# Patient Record
Sex: FEMALE | Race: WHITE | NOT HISPANIC OR LATINO | Employment: OTHER
[De-identification: names, ages, dates, MRNs, and addresses within clinical notes are randomized per-mention and may not be internally consistent; named-entity substitution may affect disease eponyms.]

---

## 2021-01-15 DIAGNOSIS — Z23 NEED FOR VACCINATION: ICD-10-CM

## 2021-11-03 ENCOUNTER — OFFICE VISIT (OUTPATIENT)
Dept: MEDICAL GROUP | Facility: CLINIC | Age: 86
End: 2021-11-03
Payer: COMMERCIAL

## 2021-11-03 VITALS
RESPIRATION RATE: 16 BRPM | BODY MASS INDEX: 20.5 KG/M2 | HEIGHT: 60 IN | SYSTOLIC BLOOD PRESSURE: 165 MMHG | DIASTOLIC BLOOD PRESSURE: 62 MMHG | WEIGHT: 104.4 LBS | HEART RATE: 61 BPM | OXYGEN SATURATION: 95 %

## 2021-11-03 DIAGNOSIS — I10 PRIMARY HYPERTENSION: ICD-10-CM

## 2021-11-03 DIAGNOSIS — L57.0 ACTINIC KERATOSES: ICD-10-CM

## 2021-11-03 DIAGNOSIS — K21.00 GASTROESOPHAGEAL REFLUX DISEASE WITH ESOPHAGITIS WITHOUT HEMORRHAGE: ICD-10-CM

## 2021-11-03 DIAGNOSIS — D32.0 INTRACRANIAL MENINGIOMA (HCC): ICD-10-CM

## 2021-11-03 PROBLEM — G93.89 MASS OF BRAIN: Status: ACTIVE | Noted: 2019-11-25

## 2021-11-03 PROBLEM — N95.1 MENOPAUSAL SYNDROME: Status: ACTIVE | Noted: 2021-11-03

## 2021-11-03 PROBLEM — K21.9 GASTROESOPHAGEAL REFLUX DISEASE: Status: ACTIVE | Noted: 2019-11-01

## 2021-11-03 PROBLEM — R42 VERTIGO: Status: ACTIVE | Noted: 2021-11-03

## 2021-11-03 PROBLEM — R41.0 MENTAL CONFUSION: Status: ACTIVE | Noted: 2019-11-01

## 2021-11-03 PROBLEM — Z00.00 ENCOUNTER FOR GENERAL ADULT MEDICAL EXAMINATION WITHOUT ABNORMAL FINDINGS: Status: ACTIVE | Noted: 2019-02-07

## 2021-11-03 PROBLEM — K58.9 IRRITABLE BOWEL SYNDROME: Status: ACTIVE | Noted: 2021-11-03

## 2021-11-03 PROCEDURE — 99213 OFFICE O/P EST LOW 20 MIN: CPT | Performed by: FAMILY MEDICINE

## 2021-11-03 RX ORDER — AMLODIPINE BESYLATE 5 MG/1
5 TABLET ORAL
COMMUNITY
Start: 2021-08-26 | End: 2022-03-04 | Stop reason: SDUPTHER

## 2021-11-03 NOTE — ASSESSMENT & PLAN NOTE
I will continue to follow along.  She no longer sees a neurosurgeon for this,  will let me know if there is any new symptoms

## 2021-11-03 NOTE — ASSESSMENT & PLAN NOTE
We will do a trial of omeprazole 20 mg once a day.  Did discuss that the nonsteroidal anti-inflammatories will make this worse.  Caffeine may be adding to it slightly if symptoms do not resolve or recur after stopping we will discuss further evaluation, possibly to include GI referral, H Pylori labs

## 2021-11-03 NOTE — ASSESSMENT & PLAN NOTE
Neither of these appear to have any concerning changes.  I would like to freeze both of them with liquid nitrogen.  Clinic does not have this yet so we will have her either return or try to get it done at our other clinic.

## 2021-11-03 NOTE — PROGRESS NOTES
"Subjective:     CC: GI c/o, rash    HPI:   Promise presents today to discuss the following issues     Problem   Vertigo   Menopausal Syndrome   Irritable Bowel Syndrome   Actinic Keratoses    Pruritic lesion on the left forearm has been there at least a couple of weeks.  No recent changes there is another lesion on the right upper arm that is not particularly itching but has been there \"quite a while\"     Intracranial Meningioma (Hcc)    No treatment has been offered, it is being observed.  Patient is asymptomatic other than a mild pressure sensation.  Has not had any other further episodes of the confusion that led to this diagnosis.     Mass of Brain   Gastroesophageal Reflux Disease    Year hx of burning, belching and gas. Has used OTC antacids, Aloe. Beans bother, Acidic  Foods worse. Drinks 3-4 cups coffee/d. Rare alcohol. Uses Aleve a few times a week. ? If effects. Rare diarrhea. occas hemmorhoidal blood.      Mental Confusion   Encounter for General Adult Medical Examination Without Abnormal Findings   Hyperlipidemia   Htn (Hypertension)    Sl elevated today.  A lot of difficulty getting to our new office.         Current Outpatient Medications Ordered in Epic   Medication Sig Dispense Refill   • metoprolol tartrate (LOPRESSOR) 25 MG Tab Take 25 mg by mouth.     • amLODIPine (NORVASC) 5 MG Tab Take 5 mg by mouth every day.       No current [facility]-ordered facility-administered medications on file.       Health Maintenance:     ROS:  Gen: no fevers/chills, no changes in weight  Eyes: no changes in vision  ENT: no sore throat, no hearing loss, no bloody nose  Pulm: no sob, no cough  CV: no chest pain, no palpitations  GI: no nausea/vomiting, no diarrhea  : no dysuria  MSk: no myalgias  Skin: no rash  Neuro: no headaches, no numbness/tingling  Heme/Lymph: no easy bruising      Objective:     Exam:  BP (!) 165/62 (BP Location: Right arm, Patient Position: Sitting, BP Cuff Size: Adult)   Pulse 61   Resp 16   Ht " 1.524 m (5')   Wt 47.4 kg (104 lb 6.4 oz)   SpO2 95%   BMI 20.39 kg/m²  Body mass index is 20.39 kg/m².    Gen: Alert and oriented, No apparent distress.  Neck: Neck is supple without lymphadenopathy.  Lungs: Normal effort, CTA bilaterally, no wheezes, rhonchi, or rales  CV: Regular rate and rhythm. No murmurs, rubs, or gallops.  Ext: No clubbing, cyanosis, edema.          Assessment & Plan:     86 y.o. female with the following -     Problem List Items Addressed This Visit     Gastroesophageal reflux disease     We will do a trial of omeprazole 20 mg once a day.  Did discuss that the nonsteroidal anti-inflammatories will make this worse.  Caffeine may be adding to it slightly if symptoms do not resolve or recur after stopping we will discuss further evaluation, possibly to include GI referral, H Pylori labs         HTN (hypertension)     We will continue to monitor.         Relevant Medications    metoprolol tartrate (LOPRESSOR) 25 MG Tab    amLODIPine (NORVASC) 5 MG Tab    Intracranial meningioma (HCC)     I will continue to follow along.  She no longer sees a neurosurgeon for this,  will let me know if there is any new symptoms         Actinic keratoses     Neither of these appear to have any concerning changes.  I would like to freeze both of them with liquid nitrogen.  Clinic does not have this yet so we will have her either return or try to get it done at our other clinic.               I spent a total of 25 minutes with record review, exam, communication with the patient, communication with other providers, and documentation of this encounter.      No follow-ups on file.

## 2022-02-22 ENCOUNTER — OFFICE VISIT (OUTPATIENT)
Dept: MEDICAL GROUP | Facility: CLINIC | Age: 87
End: 2022-02-22
Payer: COMMERCIAL

## 2022-02-22 VITALS
WEIGHT: 106 LBS | RESPIRATION RATE: 17 BRPM | HEIGHT: 60 IN | OXYGEN SATURATION: 95 % | BODY MASS INDEX: 20.81 KG/M2 | HEART RATE: 64 BPM | SYSTOLIC BLOOD PRESSURE: 132 MMHG | DIASTOLIC BLOOD PRESSURE: 71 MMHG

## 2022-02-22 DIAGNOSIS — K21.00 GASTROESOPHAGEAL REFLUX DISEASE WITH ESOPHAGITIS WITHOUT HEMORRHAGE: ICD-10-CM

## 2022-02-22 PROCEDURE — 99213 OFFICE O/P EST LOW 20 MIN: CPT | Performed by: FAMILY MEDICINE

## 2022-02-22 NOTE — PROGRESS NOTES
Subjective:     CC: gas, eructation    HPI:   Promise presents today to discuss the following issues     Problem   Gastroesophageal Reflux Disease    Saw Promise Pitts in November of last year with a year history of burning pain,  belching and gas.  We did a trial of omeprazole 20 mg a day and asked her to cut back on the nonsteroidals both of which she did. She does report that the heartburn symptoms are improved. However she is still having a lot of gas and belching. There is occasional diarrhea depending on her food intake. There is no vomiting and no abdominal pain, but there is possibly some lower pelvic congestion symptoms. There is some rectal bleeding which has been attributed to hemorrhoids, and occasional mucus.No fever or weight loss.              Current Outpatient Medications Ordered in Epic   Medication Sig Dispense Refill   • metoprolol tartrate (LOPRESSOR) 25 MG Tab Take 25 mg by mouth.     • amLODIPine (NORVASC) 5 MG Tab Take 5 mg by mouth every day.       No current [facility]-ordered facility-administered medications on file.       Health Maintenance:     ROS:  Gen: no fevers/chills, no changes in weight  Eyes: no changes in vision  ENT: no sore throat, no hearing loss, no bloody nose  Pulm: no sob, no cough  CV: no chest pain, no palpitations  GI: no nausea/vomiting, no diarrhea  : no dysuria  MSk: no myalgias  Skin: no rash  Neuro: no headaches, no numbness/tingling  Heme/Lymph: no easy bruising      Objective:     Exam:  /71 (BP Location: Right arm, Patient Position: Sitting, BP Cuff Size: Adult)   Pulse 64   Resp 17   Ht 1.524 m (5')   Wt 48.1 kg (106 lb)   SpO2 95%   BMI 20.70 kg/m²  Body mass index is 20.7 kg/m².    Gen: Alert and oriented, No apparent distress.  Neck: Neck is supple without lymphadenopathy.  Lungs: Normal effort, CTA bilaterally, no wheezes, rhonchi, or rales  CV: Regular rate and rhythm. No murmurs, rubs, or gallops.  Ext: No clubbing, cyanosis, edema.          Assessment &  Plan:     87 y.o. female with the following -     Problem List Items Addressed This Visit     Gastroesophageal reflux disease     I am not really hearing any alarm symptoms, however there is concern that the gas is not under control even with simethicone, a PPI and dietary change.  She also has a sense of pelvic congestion and is passing rectal mucus.  I am going to refer her to GI for further evaluation.  She may have some irritable bowel or other pathology going on.  Call here sooner if symptoms worsen or change or new concerns.         Relevant Orders    Referral to Gastroenterology          I spent a total of 23 minutes with record review, exam, communication with the patient, communication with other providers, and documentation of this encounter.      No follow-ups on file.

## 2022-02-22 NOTE — ASSESSMENT & PLAN NOTE
I am not really hearing any alarm symptoms, however there is concern that the gas is not under control even with simethicone, a PPI and dietary change.  She also has a sense of pelvic congestion and is passing rectal mucus.  I am going to refer her to GI for further evaluation.  She may have some irritable bowel or other pathology going on.  Call here sooner if symptoms worsen or change or new concerns.

## 2022-03-04 RX ORDER — AMLODIPINE BESYLATE 5 MG/1
5 TABLET ORAL
Qty: 30 TABLET | Refills: 11 | Status: SHIPPED | OUTPATIENT
Start: 2022-03-04 | End: 2023-02-28

## 2023-01-03 ENCOUNTER — TELEPHONE (OUTPATIENT)
Dept: MEDICAL GROUP | Facility: CLINIC | Age: 88
End: 2023-01-03
Payer: COMMERCIAL

## 2023-01-03 NOTE — TELEPHONE ENCOUNTER
Caller Name: Promise Matos    Call Back Number: [phone]      How would the patient prefer to be contacted with a response: Phone call OK to leave a detailed message    Patient would like a referral to [facility] as her insurance is requiring one from her primary office

## 2023-01-04 DIAGNOSIS — H53.9 VISION ABNORMALITIES: ICD-10-CM

## 2023-02-28 RX ORDER — AMLODIPINE BESYLATE 5 MG/1
5 TABLET ORAL
Qty: 30 TABLET | Refills: 11 | Status: SHIPPED | OUTPATIENT
Start: 2023-02-28

## 2023-05-16 ENCOUNTER — OFFICE VISIT (OUTPATIENT)
Dept: MEDICAL GROUP | Facility: CLINIC | Age: 88
End: 2023-05-16
Payer: COMMERCIAL

## 2023-05-16 DIAGNOSIS — M25.552 LEFT HIP PAIN: ICD-10-CM

## 2023-05-16 DIAGNOSIS — I10 PRIMARY HYPERTENSION: ICD-10-CM

## 2023-05-16 PROCEDURE — 99214 OFFICE O/P EST MOD 30 MIN: CPT | Performed by: FAMILY MEDICINE

## 2023-05-16 RX ORDER — MELOXICAM 7.5 MG/1
7.5 TABLET ORAL DAILY
Qty: 30 TABLET | Refills: 1 | Status: SHIPPED | OUTPATIENT
Start: 2023-05-16 | End: 2023-07-12 | Stop reason: SDUPTHER

## 2023-05-16 NOTE — ASSESSMENT & PLAN NOTE
Continue to follow.  Been on regular anti-inflammatories which I would expect to elevate this slightly but it is still under reasonable control.

## 2023-05-16 NOTE — PROGRESS NOTES
"Subjective:     CC: L hip, back pain, HTN    HPI:   Promise presents today to discuss the following issues     Problem   Left Hip Pain    Yudi is having fairly intense left hip pain and lower back pain that is been present for about 2 months.  There is been no overuse and no specific injury.  The pain seems to be worse after standing and doing household activities.  Quite intense at that point and is limiting her activities and her sleep. Aleve helps a bit.        Htn (Hypertension)    Good control. Has enough meds           Current Outpatient Medications Ordered in Epic   Medication Sig Dispense Refill    Coenzyme Q10 10 MG Cap COQ-10 10 MG ORAL CAPSULE      meloxicam (MOBIC) 7.5 MG Tab Take 1 Tablet by mouth every day. 30 Tablet 1    amLODIPine (NORVASC) 5 MG Tab TAKE 1 TABLET BY MOUTH EVERY DAY 30 Tablet 11    metoprolol tartrate (LOPRESSOR) 25 MG Tab TAKE 1 TABLET BY MOUTH TWICE DAILY 60 Tablet 11     No current [facility]-ordered facility-administered medications on file.       Health Maintenance:     ROS:  Gen: no fevers/chills, no changes in weight  Eyes: no changes in vision  ENT: no sore throat, no hearing loss, no bloody nose  Pulm: no sob, no cough  CV: no chest pain, no palpitations  GI: no nausea/vomiting, no diarrhea  : no dysuria  MSk: no myalgias  Skin: no rash  Neuro: no headaches, no numbness/tingling  Heme/Lymph: no easy bruising      Objective:     Exam:  BP (P) 138/70 (BP Location: Left arm, Patient Position: Sitting, BP Cuff Size: Adult)   Pulse (P) 60   Temp (P) 36.4 °C (97.6 °F) (Temporal)   Ht (P) 1.499 m (4' 11\")   Wt (P) 46.2 kg (101 lb 14.4 oz)   SpO2 (P) 96%   BMI (P) 20.58 kg/m²  Body mass index is 20.58 kg/m² (pended).    Gen: Alert and oriented, No apparent distress.  Neck: Neck is supple without lymphadenopathy.  Lungs: Normal effort, CTA bilaterally, no wheezes, rhonchi, or rales  CV: Regular rate and rhythm. No murmurs, rubs, or gallops.  Ext: No clubbing, cyanosis, " edema.          Assessment & Plan:     88 y.o. female with the following -     Problem List Items Addressed This Visit       HTN (hypertension)     Continue to follow.  Been on regular anti-inflammatories which I would expect to elevate this slightly but it is still under reasonable control.           Left hip pain     Exam is pretty normal.  There is full range of motion at the hip and the knee.  There is no erythema or crepitus or tenderness.  No erythema.  There is no point tenderness along the spine and percussion does not cause any radiation of symptoms.  Straight leg raising is normal.  Yudi does have a history of arthritis and that is likely contributing.  I think most of the pain is coming from her spine rather than the hip.  I am going to x-ray both areas.  I have asked her to make a follow-up with our sports medicine team in case an injection in the hip is warranted.   More likely she is going to need an epidural in the spine though and so I have started a referral to pain management.  Have asked her to stop the Aleve and we will try meloxicam 7.5 mg daily.  I did review risks and benefits of this and other NSAIDs.  Follow-up with me as well in 1 month call sooner if any change in symptoms.           Relevant Orders    DX-HIP-COMPLETE - UNILATERAL 2+ LEFT    DX-LUMBAR SPINE-2 OR 3 VIEWS    Referral to Pain Management           No follow-ups on file.

## 2023-05-16 NOTE — ASSESSMENT & PLAN NOTE
Exam is pretty normal.  There is full range of motion at the hip and the knee.  There is no erythema or crepitus or tenderness.  No erythema.  There is no point tenderness along the spine and percussion does not cause any radiation of symptoms.  Straight leg raising is normal.  Yudi does have a history of arthritis and that is likely contributing.  I think most of the pain is coming from her spine rather than the hip.  I am going to x-ray both areas.  I have asked her to make a follow-up with our sports medicine team in case an injection in the hip is warranted.   More likely she is going to need an epidural in the spine though and so I have started a referral to pain management.  Have asked her to stop the Aleve and we will try meloxicam 7.5 mg daily.  I did review risks and benefits of this and other NSAIDs.  Follow-up with me as well in 1 month call sooner if any change in symptoms.

## 2023-06-05 ENCOUNTER — TELEPHONE (OUTPATIENT)
Dept: MEDICAL GROUP | Facility: CLINIC | Age: 88
End: 2023-06-05
Payer: COMMERCIAL

## 2023-06-05 NOTE — TELEPHONE ENCOUNTER
Phone Number Called: [phone] (home)      Call outcome: Spoke to patient regarding message below.    Message: results,I have called the patient with the results and about the referrals in process. Did informed her to call us back if no calls her within 7 days.

## 2023-06-05 NOTE — TELEPHONE ENCOUNTER
----- Message from Joey Khan M.D. sent at 5/23/2023  7:07 AM PDT -----  Please notify patient I have reviewed her xray results. The hip is fairly normal. The spine does show a lot of arthritis which may be causing much of the pain. I would go ahead with a visit to sports medicine, and I did place a referral to pain management.

## 2023-07-12 ENCOUNTER — OFFICE VISIT (OUTPATIENT)
Dept: MEDICAL GROUP | Facility: CLINIC | Age: 88
End: 2023-07-12
Payer: COMMERCIAL

## 2023-07-12 VITALS
OXYGEN SATURATION: 94 % | WEIGHT: 101 LBS | RESPIRATION RATE: 12 BRPM | BODY MASS INDEX: 20.36 KG/M2 | HEART RATE: 67 BPM | SYSTOLIC BLOOD PRESSURE: 168 MMHG | HEIGHT: 59 IN | DIASTOLIC BLOOD PRESSURE: 70 MMHG

## 2023-07-12 DIAGNOSIS — M25.552 LEFT HIP PAIN: Primary | ICD-10-CM

## 2023-07-12 PROCEDURE — 3078F DIAST BP <80 MM HG: CPT

## 2023-07-12 PROCEDURE — 99213 OFFICE O/P EST LOW 20 MIN: CPT | Mod: GC

## 2023-07-12 PROCEDURE — 3077F SYST BP >= 140 MM HG: CPT

## 2023-07-12 RX ORDER — MELOXICAM 7.5 MG/1
7.5 TABLET ORAL DAILY
Qty: 30 TABLET | Refills: 2 | Status: SHIPPED | OUTPATIENT
Start: 2023-07-12 | End: 2023-12-14

## 2023-07-12 NOTE — PROGRESS NOTES
This note is formatted in an APSO format, for additional subjective and objective evaluation please scroll to the bottom of the note.    CC:  Chief Complaint   Patient presents with    Results       Assessment/Plan:  Problem List Items Addressed This Visit       Left hip pain - Primary     Discussed recent lumbar spine and hip x-ray results. She endorses continued pain in the low back, with new tingling radiating down both legs. No radiation of pain. She states she tried to get ahold of pain management, but could not make an appointment. Reported good relief with meloxicam, no new GERD or GI symptoms.   -Refill meloxicam  -Refer to PT   -Continue referral for pain management, patient would like to try PT first.         Relevant Orders    Referral to Physical Therapy      Orders Placed This Encounter    Referral to Physical Therapy    meloxicam (MOBIC) 7.5 MG Tab       No follow-ups on file.      LABS: Recent x-rays results reviewed and discussed with the patient, questions answered.    HISTORY OF PRESENT ILLNESS: Patient is a 88 y.o. female established patient who presents today with:    Problem   Left Hip Pain    Yuid is having fairly intense left hip pain and lower back pain that is been present for about 2 months.  There is been no overuse and no specific injury.  The pain seems to be worse after standing and doing household activities.  Quite intense at that point and is limiting her activities and her sleep. Aleve helps a bit.          1. Left hip pain  Referral to Physical Therapy          Patient Active Problem List    Diagnosis Date Noted    Left hip pain 05/16/2023    Vertigo 11/03/2021    Menopausal syndrome 11/03/2021    Irritable bowel syndrome 11/03/2021    Actinic keratoses 11/03/2021    Intracranial meningioma (HCC) 02/21/2020    Mass of brain 11/25/2019    Gastroesophageal reflux disease 11/01/2019    Mental confusion 11/01/2019    Encounter for general adult medical examination without abnormal  "findings 02/07/2019    Hyperlipidemia 12/07/2015    HTN (hypertension) 11/23/2015      Allergies:Oxycodone-acetaminophen and Penicillins    Current Outpatient Medications   Medication Sig Dispense Refill    meloxicam (MOBIC) 7.5 MG Tab Take 1 Tablet by mouth every day. 30 Tablet 2    Coenzyme Q10 10 MG Cap COQ-10 10 MG ORAL CAPSULE      amLODIPine (NORVASC) 5 MG Tab TAKE 1 TABLET BY MOUTH EVERY DAY 30 Tablet 11    metoprolol tartrate (LOPRESSOR) 25 MG Tab TAKE 1 TABLET BY MOUTH TWICE DAILY 60 Tablet 11     No current facility-administered medications for this visit.       Social History     Tobacco Use    Smoking status: Never    Smokeless tobacco: Never     Social History     Social History Narrative    Not on file       No family history on file.    Exam:    BP (!) 168/70 (BP Location: Right arm, Patient Position: Sitting, BP Cuff Size: Adult)   Pulse 67   Resp 12   Ht 1.499 m (4' 11\")   Wt 45.8 kg (101 lb)   SpO2 94%   BMI 20.40 kg/m²  Body mass index is 20.4 kg/m².    Gen: Alert and oriented, No apparent distress. Well developed  HEENT: NCAT, MMM  Neck: Supple, FROM  Chest: No deformities, Equal chest expansion  Lungs: Normal effort, CTA bilaterally, no wheezes, rhonchi, or rales  CV: Regular rate and rhythm. No murmurs, rubs, or gallops. Pulse palpable  Abd: Non-distended  Ext: No clubbing, cyanosis, edema.  Back:  Obvious dextro scoliosis to the lumbar region, increased muscle mass to the right paraspinal musculature.  Limited range of motion.  Skin: Warm/dry, without rashes  Neuro: A/O x 4, CN 2-12 Grossly intact, Motor/sensory grossly intact  Psych: Normal behavior, normal affect      Deniz Gray MD  PGY-1  [facility]    "

## 2023-07-12 NOTE — ASSESSMENT & PLAN NOTE
Discussed recent lumbar spine and hip x-ray results. She endorses continued pain in the low back, with new tingling radiating down both legs. No radiation of pain. She states she tried to get ahold of pain management, but could not make an appointment. Reported good relief with meloxicam, no new GERD or GI symptoms.   -Refill meloxicam  -Refer to PT   -Continue referral for pain management, patient would like to try PT first.

## 2023-12-14 ENCOUNTER — OFFICE VISIT (OUTPATIENT)
Dept: MEDICAL GROUP | Facility: CLINIC | Age: 88
End: 2023-12-14
Payer: COMMERCIAL

## 2023-12-14 VITALS
BODY MASS INDEX: 19.8 KG/M2 | SYSTOLIC BLOOD PRESSURE: 118 MMHG | TEMPERATURE: 97.8 F | OXYGEN SATURATION: 94 % | HEART RATE: 68 BPM | DIASTOLIC BLOOD PRESSURE: 72 MMHG | WEIGHT: 98.2 LBS | HEIGHT: 59 IN

## 2023-12-14 DIAGNOSIS — Z23 NEED FOR VACCINATION: ICD-10-CM

## 2023-12-14 DIAGNOSIS — M25.552 LEFT HIP PAIN: ICD-10-CM

## 2023-12-14 DIAGNOSIS — N95.1 MENOPAUSAL STATE: ICD-10-CM

## 2023-12-14 DIAGNOSIS — Z78.0 POSTMENOPAUSAL STATUS (AGE-RELATED) (NATURAL): ICD-10-CM

## 2023-12-14 DIAGNOSIS — I10 PRIMARY HYPERTENSION: ICD-10-CM

## 2023-12-14 PROCEDURE — G0009 ADMIN PNEUMOCOCCAL VACCINE: HCPCS | Performed by: FAMILY MEDICINE

## 2023-12-14 PROCEDURE — 90677 PCV20 VACCINE IM: CPT | Performed by: FAMILY MEDICINE

## 2023-12-14 PROCEDURE — 3078F DIAST BP <80 MM HG: CPT | Performed by: FAMILY MEDICINE

## 2023-12-14 PROCEDURE — 3074F SYST BP LT 130 MM HG: CPT | Performed by: FAMILY MEDICINE

## 2023-12-14 PROCEDURE — 99213 OFFICE O/P EST LOW 20 MIN: CPT | Mod: 25 | Performed by: FAMILY MEDICINE

## 2023-12-14 NOTE — ASSESSMENT & PLAN NOTE
I am happy to refill her amlodipine and metoprolol tartrate at any time until she finds a new provider.

## 2023-12-14 NOTE — ASSESSMENT & PLAN NOTE
Her exam is normal.  I did review her options again including referral to pain management for possible epidural injection should it become intolerable.  I also offered to refill her meloxicam but she does not want that.

## 2023-12-14 NOTE — PROGRESS NOTES
Subjective:     CC: Hip/back pain, HTN meds    HPI:   Promise presents today to discuss the following issues   She will be moving to another provider as [facility] no longer takes her insurance       Problem   Left Hip Pain    Her hip pain is quite a bit better following physical therapy.  She is no longer taking meloxicam.  Does suffer from some fairly intense bandlike pain in the lower back with overexertion.  She does find that when she stops what she is doing it gets back to tolerable levels pretty quickly.  Does not want further evaluation or referral to pain management at this time.    Prior note:    Yudi is having fairly intense left hip pain and lower back pain that is been present for about 2 months.  There is been no overuse and no specific injury.  The pain seems to be worse after standing and doing household activities.  Quite intense at that point and is limiting her activities and her sleep. Aleve helps a bit.      Htn (Hypertension)    Good control.    She has enough medicines to last her until February.  She would like to know that I will refill them in the meantime if she runs out.         Current Outpatient Medications Ordered in Epic   Medication Sig Dispense Refill    Zoster Vac Recomb Adjuvanted (SHINGRIX) 50 MCG/0.5ML Recon Susp Inject 0.5 mL into the shoulder, thigh, or buttocks one time for 1 dose. 0.5 mL 0    Coenzyme Q10 10 MG Cap COQ-10 10 MG ORAL CAPSULE      amLODIPine (NORVASC) 5 MG Tab TAKE 1 TABLET BY MOUTH EVERY DAY 30 Tablet 11    metoprolol tartrate (LOPRESSOR) 25 MG Tab TAKE 1 TABLET BY MOUTH TWICE DAILY 60 Tablet 11     No current [facility]-ordered facility-administered medications on file.       Health Maintenance:     ROS:  Gen: no fevers/chills, no changes in weight  Eyes: no changes in vision  ENT: no sore throat, no hearing loss, no bloody nose  Pulm: no sob, no cough  CV: no chest pain, no palpitations  GI: no nausea/vomiting, no diarrhea  : no dysuria  MSk: no myalgias  Skin: no  "rash  Neuro: no headaches, no numbness/tingling  Heme/Lymph: no easy bruising      Objective:     Exam:  /72 (BP Location: Left arm, Patient Position: Sitting, BP Cuff Size: Adult)   Pulse 68   Temp 36.6 °C (97.8 °F) (Temporal)   Ht 1.499 m (4' 11\")   Wt 44.5 kg (98 lb 3.2 oz)   SpO2 94%   BMI 19.83 kg/m²  Body mass index is 19.83 kg/m².    Gen: Alert and oriented, No apparent distress.  Neck: Neck is supple without lymphadenopathy.  Lungs: Normal effort, CTA bilaterally, no wheezes, rhonchi, or rales  CV: Regular rate and rhythm. No murmurs, rubs, or gallops.  Ext: No clubbing, cyanosis, edema.          Assessment & Plan:     88 y.o. female with the following -     Problem List Items Addressed This Visit       HTN (hypertension)     I am happy to refill her amlodipine and metoprolol tartrate at any time until she finds a new provider.         Left hip pain     Her exam is normal.  I did review her options again including referral to pain management for possible epidural injection should it become intolerable.  I also offered to refill her meloxicam but she does not want that.          Other Visit Diagnoses       Postmenopausal status (age-related) (natural)        Relevant Orders    DS-BONE DENSITY STUDY (DEXA)    Menopausal state        Relevant Orders    DS-BONE DENSITY STUDY (DEXA)    Need for vaccination        Relevant Medications    Zoster Vac Recomb Adjuvanted (SHINGRIX) 50 MCG/0.5ML Recon Susp    Other Relevant Orders    Pneumococcal Conjugate Vaccine 20-Valent (6 wks+) (Completed)            I spent a total of 23 minutes with record review, exam, communication with the patient, communication with other providers, and documentation of this encounter.      No follow-ups on file.            "

## 2024-02-12 ENCOUNTER — TELEPHONE (OUTPATIENT)
Dept: HEALTH INFORMATION MANAGEMENT | Facility: OTHER | Age: 89
End: 2024-02-12

## 2024-02-12 NOTE — TELEPHONE ENCOUNTER
Received request via: Patient    Was the patient seen in the last year in this department? Yes    Does the patient have an active prescription (recently filled or refills available) for medication(s) requested? No    Pharmacy Name[facility]    Does the patient have nursing home Plus and need 100 day supply (blood pressure, diabetes and cholesterol meds only)? Patient does not have SCP

## 2024-03-06 ENCOUNTER — OFFICE VISIT (OUTPATIENT)
Dept: MEDICAL GROUP | Facility: CLINIC | Age: 89
End: 2024-03-06

## 2024-03-06 VITALS
OXYGEN SATURATION: 97 % | BODY MASS INDEX: 19.77 KG/M2 | DIASTOLIC BLOOD PRESSURE: 72 MMHG | HEIGHT: 60 IN | WEIGHT: 100.7 LBS | SYSTOLIC BLOOD PRESSURE: 138 MMHG | HEART RATE: 68 BPM | TEMPERATURE: 97.8 F

## 2024-03-06 DIAGNOSIS — I10 PRIMARY HYPERTENSION: ICD-10-CM

## 2024-03-06 DIAGNOSIS — M81.0 AGE-RELATED OSTEOPOROSIS WITHOUT CURRENT PATHOLOGICAL FRACTURE: ICD-10-CM

## 2024-03-06 DIAGNOSIS — Z00.00 ENCOUNTER FOR GENERAL ADULT MEDICAL EXAMINATION WITHOUT ABNORMAL FINDINGS: ICD-10-CM

## 2024-03-06 PROCEDURE — 99213 OFFICE O/P EST LOW 20 MIN: CPT | Performed by: FAMILY MEDICINE

## 2024-03-06 PROCEDURE — 3075F SYST BP GE 130 - 139MM HG: CPT | Performed by: FAMILY MEDICINE

## 2024-03-06 PROCEDURE — 3078F DIAST BP <80 MM HG: CPT | Performed by: FAMILY MEDICINE

## 2024-03-06 NOTE — PROGRESS NOTES
Subjective:     CC: Osteoporosis, HTN, HCM    HPI:   Promise presents today to discuss the following issues     Problem   Age-Related Osteoporosis Without Current Pathological Fracture    Promise Pitts has a longstanding history of osteoporosis.  She took Fosamax years ago but did not tolerate it.  She has not been on any medication for this for a number of years.  Recent DEXA scan did continue to show osteoporosis with some decreased bone mass compared to prior.  Has not had any pathologic or other fractures.  Remains physically active is overall healthy.     Encounter for General Adult Medical Examination Without Abnormal Findings    Her diet is generally good, she remains mentally sharp and physically active.  Have some aches and pains, including some fairly marked radicular type of back pain and leg pain in the past. Questions whether labs are in order     Htn (Hypertension)    Blood pressure remains well-controlled.  She continues to take amlodipine and metoprolol.  Prior:  Good control.    She has enough medicines to last her until February.  She would like to know that I will refill them in the meantime if she runs out.         Current Outpatient Medications Ordered in Epic   Medication Sig Dispense Refill    metoprolol tartrate (LOPRESSOR) 25 MG Tab TAKE 1 TABLET BY MOUTH TWICE DAILY 60 Tablet 11    Coenzyme Q10 10 MG Cap COQ-10 10 MG ORAL CAPSULE      amLODIPine (NORVASC) 5 MG Tab TAKE 1 TABLET BY MOUTH EVERY DAY 30 Tablet 11     No current [facility]-ordered facility-administered medications on file.       Health Maintenance:     ROS:  Gen: no fevers/chills, no changes in weight  Eyes: no changes in vision  ENT: no sore throat, no hearing loss, no bloody nose  Pulm: no sob, no cough  CV: no chest pain, no palpitations  GI: no nausea/vomiting, no diarrhea  : no dysuria  MSk: no myalgias  Skin: no rash  Neuro: no headaches, no numbness/tingling  Heme/Lymph: no easy bruising      Objective:     Exam:  /72 (BP Location:  Left arm, Patient Position: Sitting, BP Cuff Size: Adult)   Pulse 68   Temp 36.6 °C (97.8 °F) (Temporal)   Ht 1.524 m (5')   Wt 45.7 kg (100 lb 11.2 oz)   SpO2 97%   BMI 19.67 kg/m²  Body mass index is 19.67 kg/m².    Gen: Alert and oriented, No apparent distress.  Neck: Neck is supple without lymphadenopathy.  Lungs: Normal effort, CTA bilaterally, no wheezes, rhonchi, or rales  CV: Regular rate and rhythm. No murmurs, rubs, or gallops.  Ext: No clubbing, cyanosis, edema.          Assessment & Plan:     89 y.o. female with the following -     Problem List Items Addressed This Visit          Family Medicine Problems    Encounter for general adult medical examination without abnormal findings     We did a thorough discussion as to Dianne and checking labs.  It is been several years since she had any done for any reason.  Given her age, lack of concerning physical symptoms and comorbidities I am not recommending any lab work done right now.  Of note though, she does have hyperlipidemia and has not wanted to be on statins.  Also see no need and checking a lipid profile as we would be unlikely to make any changes based on that.  Want to continue to see her on a regular basis and we will address any new concerns as they arise.            Other    HTN (hypertension)     I reviewed again with her that beta-blockers are no longer considered first-line for hypertension and in fact that there may be some risks associated.  Has been well-controlled on this medicine for many years side effects.  We have elected to continue to leave it be for now but she understands that further discussions come up down the road especially if other doctors assume her care.  She has enough medicines I will not refill any at the moment         Age-related osteoporosis without current pathological fracture     We had a fairly extensive discussion about risks and benefits, factoring in a lack of comorbidities, her age of 89 and prior  intolerance of medications.  We agreed that we would not try prescription medication at this time, but she will resume vitamin D and calcium, remain as physically active as possible and try to insure that her home is fairly safe from falls or tripping.  She does have an ankle that gives out on her once in a while, she does not want to go back to physical therapy right now.  Will continue to monitor.                No follow-ups on file.

## 2024-03-06 NOTE — ASSESSMENT & PLAN NOTE
We had a fairly extensive discussion about risks and benefits, factoring in a lack of comorbidities, her age of 89 and prior intolerance of medications.  We agreed that we would not try prescription medication at this time, but she will resume vitamin D and calcium, remain as physically active as possible and try to insure that her home is fairly safe from falls or tripping.  She does have an ankle that gives out on her once in a while, she does not want to go back to physical therapy right now.  Will continue to monitor.

## 2024-03-06 NOTE — ASSESSMENT & PLAN NOTE
We did a thorough discussion as to Dianne and checking labs.  It is been several years since she had any done for any reason.  Given her age, lack of concerning physical symptoms and comorbidities I am not recommending any lab work done right now.  Of note though, she does have hyperlipidemia and has not wanted to be on statins.  Also see no need and checking a lipid profile as we would be unlikely to make any changes based on that.  Want to continue to see her on a regular basis and we will address any new concerns as they arise.

## 2024-03-06 NOTE — ASSESSMENT & PLAN NOTE
I reviewed again with her that beta-blockers are no longer considered first-line for hypertension and in fact that there may be some risks associated.  Has been well-controlled on this medicine for many years side effects.  We have elected to continue to leave it be for now but she understands that further discussions come up down the road especially if other doctors assume her care.  She has enough medicines I will not refill any at the moment

## 2025-04-16 NOTE — TELEPHONE ENCOUNTER
Received request via: Pharmacy    Was the patient seen in the last year in this department? No    Does the patient have an active prescription (recently filled or refills available) for medication(s) requested? No    Pharmacy Name: [id number] - [place], [place] - [address] AT [facility] [address]     Does the patient have snf Plus and need 100-day supply? (This applies to ALL medications) Patient does not have SCP

## 2025-04-17 RX ORDER — METOPROLOL TARTRATE 25 MG/1
25 TABLET, FILM COATED ORAL 2 TIMES DAILY
Qty: 60 TABLET | Refills: 5 | Status: SHIPPED | OUTPATIENT
Start: 2025-04-17

## 2025-07-23 RX ORDER — METOPROLOL TARTRATE 25 MG/1
25 TABLET, FILM COATED ORAL 2 TIMES DAILY
Qty: 60 TABLET | Refills: 0 | Status: SHIPPED | OUTPATIENT
Start: 2025-07-23

## 2025-07-23 NOTE — TELEPHONE ENCOUNTER
Received request via: Pharmacy    Was the patient seen in the last year in this department? No    Does the patient have an active prescription (recently filled or refills available) for medication(s) requested? No    Pharmacy Name: [facility] #[id number] - [place], [place] - [address] AT [facility] [address]     Does the patient have longterm Plus and need 100-day supply? (This applies to ALL medications) Patient does not have SCP